# Patient Record
Sex: MALE | Race: WHITE | NOT HISPANIC OR LATINO | URBAN - METROPOLITAN AREA
[De-identification: names, ages, dates, MRNs, and addresses within clinical notes are randomized per-mention and may not be internally consistent; named-entity substitution may affect disease eponyms.]

---

## 2023-10-12 ENCOUNTER — EMERGENCY (EMERGENCY)
Facility: HOSPITAL | Age: 38
LOS: 1 days | Discharge: HOME | End: 2023-10-12
Attending: EMERGENCY MEDICINE
Payer: SELF-PAY

## 2023-10-12 VITALS
TEMPERATURE: 97 F | WEIGHT: 222.67 LBS | RESPIRATION RATE: 20 BRPM | SYSTOLIC BLOOD PRESSURE: 119 MMHG | OXYGEN SATURATION: 99 % | DIASTOLIC BLOOD PRESSURE: 74 MMHG | HEART RATE: 102 BPM

## 2023-10-12 PROCEDURE — 99285 EMERGENCY DEPT VISIT HI MDM: CPT

## 2023-10-12 PROCEDURE — 82962 GLUCOSE BLOOD TEST: CPT

## 2023-10-12 PROCEDURE — 99283 EMERGENCY DEPT VISIT LOW MDM: CPT

## 2023-10-12 PROCEDURE — 99053 MED SERV 10PM-8AM 24 HR FAC: CPT

## 2023-10-12 NOTE — ED PROVIDER NOTE - PHYSICAL EXAMINATION
Ambulatory with steady gait is calm cooperative awake alert oriented x3.  Abrasion to nose no active bleeding.

## 2023-10-12 NOTE — ED PROVIDER NOTE - OBJECTIVE STATEMENT
38-year-old male brought in for alcohol intoxication.  Patient states he just arrived from [facility] and was celebrating and maybe drank a little too much.  He then tripped and fell's call sustaining an abrasion to his nose.  Patient denies any other complaints and would like to be discharged.  Patient's ex-wife is here at bedside and will escort patient home.

## 2023-10-12 NOTE — ED ADULT NURSE NOTE - OBJECTIVE STATEMENT
BIBA, As per EMS patient was found intoxicated. Abrasion noted to forehead. Patient alert and ambulatory. No apparent distress noted.

## 2023-10-12 NOTE — ED ADULT TRIAGE NOTE - CHIEF COMPLAINT QUOTE
He was found walking with unsteady gait in the street.  Patient admits to be drinking alcohol.  He has abrasions to nose.

## 2023-10-12 NOTE — ED PROVIDER NOTE - CLINICAL SUMMARY MEDICAL DECISION MAKING FREE TEXT BOX
Patient with alcohol use today with fall with a nasal abrasion.  Patient is clinically sober requesting discharge.  Patient not wait for discharge papers prior to leaving the hospital.  Ambulating with steady gait not in any distress.

## 2023-10-12 NOTE — ED PROVIDER NOTE - PATIENT PORTAL LINK FT
You can access the FollowMyHealth Patient Portal offered by [facility] by registering at the following website: [email or web address]. By joining [employer]’s FollowMyHealth portal, you will also be able to view your health information using other applications (apps) compatible with our system.

## 2023-10-12 NOTE — ED PROVIDER NOTE - NSFOLLOWUPINSTRUCTIONS_ED_ALL_ED_FT
Binge-Drinking Information, Adult  Binge-drinking means drinking a large amount of alcohol in a short time. This is usually 5 drinks for men or 4 drinks for women, on one occasion.    People who binge-drink do not always have an alcohol problem. However, binge-drinking can raise your risk of becoming dependent on alcohol. Becoming dependent on alcohol is called alcohol use disorder.    Binge drinking can:  Cause health problems, such as heart disease, liver disease, or cancer.  Cause problems between you and family members or friends.  Lead to legal and financial problems.  How can binge-drinking affect me?  Friends and family may notice signs of binge-drinking or alcohol use disorder before you do. Binge-drinking can put you at risk for serious health problems, including:  Accidental injuries, such as alcohol poisoning or falls.  Developing alcohol use disorder.  Violence, such as sexual assault.  STIs (sexually transmitted infections).  Mental health problems, such as anxiety or depression.  Liver disease.  Heart disease.  Cancer of the liver, colon, esophagus, breast, or mouth.  Binge-drinking can raise your risk of these problems:  Car accidents.  Problems with relationships and other social situations.  Legal or financial problems.  Unplanned pregnancies.  If you binge-drink while pregnant, you and your baby may be at risk for health problems, including:  Miscarriage.  Stillbirth.  Fetal alcohol spectrum disorders (FASDs).  Sudden infant death syndrome (SIDS).  What actions can I take to prevent binge-drinking?  A couple holding hands while walking.  A person sitting on the floor doing yoga.   Avoid drinking too much alcohol.  If you drink alcohol:  Limit how much you have to:  0–1 drink a day for women who are not pregnant.  0–2 drinks a day for men.  Know how much alcohol is in a drink. In the [place] one drink equals one 12 oz bottle of beer (355 mL), one 5 oz glass of wine (148 mL), or one 1½ oz glass of hard liquor (44 mL).  Encourage others around you not to binge-drink.  Become aware of situations and people who trigger your drinking behavior, and either avoid those or find a new way to deal with them. Find hobbies that you can do instead of drinking, such as exercising or outdoor activities.  Do not drink if:  You are pregnant or trying to become pregnant.  You plan to drive a vehicle.  You plan to use machinery, such as a  or power tool.  You have a health condition that alcohol can make worse.  You take medicines that are affected by alcohol, including prescription pain medicines.  You are recovering from alcohol use disorder.  Develop skills to manage your moods and emotions so you do not need to drink to cope with them. This may include using stress reduction techniques such as:  Deep breathing.  Meditation or yoga.  Exercise or playing sports.  Keeping a stress diary.  Listening to music.  What are the benefits of controlling my drinking?  Controlling your drinking or quitting drinking can make you feel better about your life. It can also:  Help you control your weight.  Make you more likely to get into good physical shape and stay fit.  Improve how your body processes vitamins and minerals.  Improve your health by lowering your blood pressure, cholesterol, triglycerides, and blood sugar (glucose).  Help you think more clearly and make better decisions.  Where to find support:  You can get support to stop binge-drinking from:  Your health care provider. They may recommend counseling if you drink too much.  The National Drug and Alcohol Treatment Referral Service: 6-750-921-HELP (5656)  Alcoholics Anonymous, Alcoholics Resource Center: 1-879.881.5933  Substance Abuse and Mental Health Services Administration: sama.gov  Where to find more information:  Centers for Disease Control and Prevention: cdc.gov  National Wallace on Alcohol Abuse and Alcoholism: niaaa.nih.gov/alcohol  Contact a health care provider if:  You cannot control your drinking, or you think that your drinking might be out of your control.  You have unexpected physical problems that cause you distress, such as accidental injuries.  Get help right away if:  You have thoughts about hurting yourself or others.  Get help right away if you feel like you may hurt yourself or others, or have thoughts about taking your own life.  Call 101.  Call the National Suicide Prevention Lifeline at 1-751.166.9789 or 690. This is open 24 hours a day.  Text the Crisis Text Line at 315534.  Summary  Binge-drinking refers to drinking a large amount of alcohol in a short time. This is usually 5 drinks for men or 4 drinks for women, on one occasion.  Binge-drinking can lead to serious health problems, such as heart disease, liver disease, or cancer.  Binge-drinking raises your risk of developing alcohol dependence (alcohol use disorder) and social and relationship problems.  Talk with your health care provider about your drinking habits. They may recommend counseling if you drink too much.  This information is not intended to replace advice given to you by your health care provider. Make sure you discuss any questions you have with your health care provider.    Document Revised: 03/06/2023 Document Reviewed: 03/06/2023

## 2023-10-12 NOTE — ED ADULT NURSE NOTE - NSFALLUNIVINTERV_ED_ALL_ED
Bed/Stretcher in lowest position, wheels locked, appropriate side rails in place/Call bell, personal items and telephone in reach/Instruct patient to call for assistance before getting out of bed/chair/stretcher/Non-slip footwear applied when patient is off stretcher/[place] to call system/Physically safe environment - no spills, clutter or unnecessary equipment/Purposeful proactive rounding/Room/bathroom lighting operational, light cord in reach
